# Patient Record
Sex: FEMALE | Race: WHITE | NOT HISPANIC OR LATINO | Employment: OTHER | URBAN - METROPOLITAN AREA
[De-identification: names, ages, dates, MRNs, and addresses within clinical notes are randomized per-mention and may not be internally consistent; named-entity substitution may affect disease eponyms.]

---

## 2017-05-19 ENCOUNTER — INFUSION - HEALTHEAST (OUTPATIENT)
Dept: INFUSION THERAPY | Facility: CLINIC | Age: 62
End: 2017-05-19

## 2017-05-19 ENCOUNTER — INFUSION - HEALTHEAST (OUTPATIENT)
Dept: INFUSION THERAPY | Facility: HOSPITAL | Age: 62
End: 2017-05-19

## 2017-05-19 DIAGNOSIS — Z20.9 EXPOSURE TO BAT WITHOUT KNOWN BITE: ICD-10-CM

## 2017-05-23 ENCOUNTER — INFUSION - HEALTHEAST (OUTPATIENT)
Dept: INFUSION THERAPY | Facility: CLINIC | Age: 62
End: 2017-05-23

## 2017-05-23 DIAGNOSIS — Z20.9 EXPOSURE TO BAT WITHOUT KNOWN BITE: ICD-10-CM

## 2017-05-30 ENCOUNTER — RECORDS - HEALTHEAST (OUTPATIENT)
Dept: ADMINISTRATIVE | Facility: OTHER | Age: 62
End: 2017-05-30

## 2017-05-30 ENCOUNTER — INFUSION - HEALTHEAST (OUTPATIENT)
Dept: INFUSION THERAPY | Facility: CLINIC | Age: 62
End: 2017-05-30

## 2017-05-30 DIAGNOSIS — Z20.9 EXPOSURE TO BAT WITHOUT KNOWN BITE: ICD-10-CM

## 2017-08-19 ENCOUNTER — OFFICE VISIT - HEALTHEAST (OUTPATIENT)
Dept: FAMILY MEDICINE | Facility: CLINIC | Age: 62
End: 2017-08-19

## 2017-08-19 DIAGNOSIS — M25.50 ARTHRALGIA: ICD-10-CM
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
## 2017-08-19 DIAGNOSIS — S80.861A: ICD-10-CM

## 2021-05-31 VITALS — WEIGHT: 141.4 LBS | BODY MASS INDEX: 21.5 KG/M2

## 2021-06-10 NOTE — PROGRESS NOTES
Pt working in woods, saw a bat fly at her, saw skin broken L AC; unsure if bat bit her or not. Rabies info sheet given by Carly BRANTLEY. Pt tolerated inj well.

## 2021-06-15 PROBLEM — Z20.9 EXPOSURE TO BAT WITHOUT KNOWN BITE: Status: ACTIVE | Noted: 2017-05-19

## 2021-06-25 NOTE — PROGRESS NOTES
Progress Notes by Micheal Morfin DO at 8/19/2017 11:00 AM     Author: Micheal Morfin DO Service: -- Author Type: Physician    Filed: 8/20/2017  1:28 PM Encounter Date: 8/19/2017 Status: Signed    : Micheal Morfin DO (Physician)       Chief Complaint   Patient presents with   ? Insect Bite     had tick bite end of May. Still having fatigue and muscle pain.        History of Present Illness: Nursing notes reviewed. Patient had a bite from a very small, dark tick in right calf area, with a 3-4 area of redness noted there lasting for weeks. This bite area has healed up. She had had persistent headache and joint aches since the tick bite. No fever. The joints hurt worse then the muscles. She prefers not to take an antibiotic treatment until seeing what the lab results show.    Review of systems: See history of present illness, otherwise negative.     No current outpatient prescriptions on file.     No current facility-administered medications for this visit.        No past medical history on file.   No past surgical history on file.   Social History     Social History   ? Marital status: Single     Spouse name: N/A   ? Number of children: N/A   ? Years of education: N/A     Social History Main Topics   ? Smoking status: Former Smoker   ? Smokeless tobacco: None   ? Alcohol use No   ? Drug use: None   ? Sexual activity: Not Asked     Other Topics Concern   ? None     Social History Narrative       History   Smoking Status   ? Former Smoker   Smokeless Tobacco   ? Not on file      Exam:   Blood pressure 126/72, pulse 68, temperature 97.5  F (36.4  C), temperature source Oral, weight 141 lb 6.4 oz (64.1 kg), SpO2 98 %.    EXAM:   General: Vital signs reviewed. Patient is in no acute appearing distress with a pleasant affect. Breathing is non labored appearing. Patient is alert and oriented x 3.   ENT: Tympanic membranes are clear and without injection bilaterally, nasal turbinates show no injection or rhinorrhea,  "no pharyngeal injection or exudate.  Eyes: normal white sclera  Neck: supple with no adenopathy.  Heart: Normal rate and rhythm without murmur  Lungs: Clear to auscultation with good air flow bilaterally.  Skin: warm and dry. No areas of rash or other skin discoloration. No abnormality noted in the reported area of tick bite in right lower leg. No small or large joint edema, discoloration, or increased temperature.  Patient preferred to only have the tests done for assessment for specific tick related illnesses.    Assessment/Plan   1. Tick bite of right lower leg  Rickettsia rickettsii Antibody    Lyme Antibody Cascade    Ehrlichia/Anaplasma, Molecular Detection, PCR, Whole Blood   2. Arthralgia  Rickettsia rickettsii Antibody    Lyme Antibody Cascade    Ehrlichia/Anaplasma, Molecular Detection, PCR, Whole Blood       Patient Instructions     Also see info below. We will notify you of the results of studies not known at time of exam, and treat appropriately.    Tick Bites  Ticks are insects that feed on the blood of animals and humans. They may gorge themselves for days before you find and remove them. The bites themselves aren't cause for concern. But ticks can carry and transmit illnesses such as Lyme disease and [place] spotted fever. Both diseases begin with a rash and symptoms similar to the flu. In advanced stages, these diseases can be quite serious.     A \"bull's eye\" rash is a common symptom of Lyme disease.   When to go to the emergency department (ED)  Not all ticks carry disease. And a tick must remain attached for at least 24 hours to infect you. If you find a tick, don't panic. Try to carefully remove it with tweezers. Grasp the insect near its head and pull without twisting. If you can't easily dislodge the tick or if you leave the head in your skin, get medical care right away.  What to expect in the ED    The tick or any remnants will be removed and the bite cleaned.    To prevent disease, " you may be given antibiotics. Both Lyme disease and [place] spotted fever respond quickly to these medications.    You may be asked to see your health care provider for a blood test to check for Lyme disease.  Follow-up care  If you remove a tick yourself, watch for signs of a tick-borne illness. Symptoms may appear within a few days or weeks after a bite. Call your health care provider if you notice any of the following:    Rash (This may spread outward in a ring from a hard white lump. Or, it may move up your arms and legs to your chest.)    Chills and fever    Body aches and joint pain    Severe headache  Date Last Reviewed: 11/30/2014 2000-2016 [employer]. [address] [place] [address], [place] [id number]. All rights reserved. This information is not intended as a substitute for professional medical care. Always follow your healthcare professional's instructions.        Understanding Ehrlichiosis  When a tick bites you, it can cause an infection. Some types of ticks can pass on the bacteria that cause ehrlichiosis. This infection can develop into a serious illness. If you get a tick bite and then develop symptoms, talk with your healthcare provider right away.  How to say it:  [id number]   What causes ehrlichiosis?  The bacteria that cause ehrlichiosis are passed to people through tick bites. Ticks are most active between April and September. The risk of getting bitten and infected is higher during these months. Ehrlichiosis may also be passed on through blood transfusions or by direct contact with an infected deer.  What are the symptoms of ehrlichiosis?  Symptoms usually show up 1 to 2 weeks after you are bitten by a tick. Symptoms may include:    Fever or chills    Headache    Muscle or joint pain    Tiredness or feeling unwell    Swollen lymph nodes    Rash (more common in children)    Red eyes    Nausea, vomiting, or diarrhea    Confusion    Cough  How is ehrlichiosis  treated?  Treatment focuses on killing the bacteria that cause ehrlichiosis. This is done by taking antibiotics. Other medicines can help relieve pain.  How can I prevent ehrlichiosis?  Avoiding tick bites is the best way to prevent ehrlichiosis. Here are some ways to avoid getting tick bites:    Put insect repellent containing DEET on exposed skin when you are outside. Use DEET very cautiously on young children.    Treat clothing and hiking or camping gear with products that have permethrin.    Avoid walking through brush and grass.    Look for ticks on yourself when you have been outdoors. Check all parts of your body. Remove any ticks you find right away.    If youe any pets, check them for ticks after they have been outdoors  What are the possible complications of ehrlichiosis?  Children and people with a weak immune system are more likely than healthy adults to have complications. Complications of ehrlichiosis can include:    Difficulty breathing    Bleeding problems    Liver or kidney failure    Inflammation or infection of the brain or its covering    Death  When should I call my healthcare provider?  Call your healthcare provider right away if you have any of these:    Fever of 100.4 F (38 C) or higher, or as directed    Pain that gets worse    Symptoms that dont get better with treatment, or symptoms that get worse    New symptoms   Date Last Reviewed: 3/30/2016    4120-3368 [employer] [address] [place] [address], [place] [address]. All rights reserved. This information is not intended as a substitute for professional medical care. Always follow your healthcare professional's instructions.        Preventing Lyme Disease  Ticks are small spider-like arachnids that feed on the blood of animals and humans. They can carry the bacteria that cause Lyme disease. The bacteria can pass to a person from a tick bite. (It is not passed from person to person.) Lyme disease can lead to serious health problems  if it is not treated with antibiotics. The best way to prevent Lyme disease is to avoid being bitten by a tick.     The tick that carries Lyme disease is very small--about the size of a poppy seed.   Preventing tick bites  The disease is carried by the blacklegged tick, also called a deer tick. Young ticks called nymphs are the most likely to carry the bacteria. They are very small--about the size of a poppy seed. They can be found on deer, rodents, and birds. Often the animal brushes the tick onto leaves or other plants as it runs through the woods and then the tick lives in bushes, grasses, and dead leaves. The most active time of year for infected ticks varies by region of the country. In the [place] and [place], they are more active from April to September. In the [place], they may be more active from December to February. But you can still be bitten at other times of the year. Below are tips for protecting yourself from tick bites.  Protect your body    Wear clothes that protect you. Clothing can help protect you from tick bites. Wear long pants and long sleeves in outdoor areas where ticks may live. Tuck your shirt into your pants. Tuck pant legs into your socks. And wear light colors so you can more easily see ticks on your clothes.    Use insect repellent. Spray insect repellent containing at least 20 percent DEET on your exposed skin. You can also use it on clothing, shoes, and camping gear. Avoid getting DEET on childrens hands, mouth, or eyes. You can use products with permethrin on clothing, shoes, and camping gear. But do not spray permethrin on skin. It will cause a rash. Follow the directions on the package of the spray you use. For more information on bug sprays, visit the National Pesticide Information Center at http://npic.orst.edu.    Avoid tick-infested areas. Avoid brushing against grasses, bushes, and other plants. Avoid walking through dead leaves and other ground vegetation. Do not sit on fallen  logs. And avoid areas with large numbers of deer and rodents.    Check yourself for ticks. After being outdoors, check your clothes and skin for ticks. Keep in mind that the ticks are about the size of a poppy seed. Use both a hand-held and a full-length mirror to view all of your skin. Pay special attention to areas with hair. Make sure to thoroughly check these areas:    Scalp    Behind the ears    Armpits    Belly button    Waist    Groin    Backs of the knees    Use the clothes dryer. Putting clothing or bedding into a clothes dryer for 1 hour at high heat has been shown to kill ticks.  Control ticks around your home    Create tick-free safety zones. Ticks that transmit Lyme disease live in ground vegetation. Ticks crawl onto people from shrubs and grasses in and around wooded areas. Cut bushes and other plants away from the deck or patio and any child play areas. Keep all grasses cut short. Remove dead leaves and other dead vegetation. Do not put childrens play equipment near wooded areas. Put wood chips or gravel on the ground between lawns and wooded areas.    Use pesticides. You can apply them yourself or hire a pest control expert. States have different regulations about pesticides. Ask your local health department for information.    Keep deer away. Deer often carry the ticks that can infect you with Lyme disease. Do not attempt to pet or feed deer. Ask your local garden center about deer-resistant plants. Ask your local health department about deer control in your area.    Prevent ticks on pets. Pets can bring ticks into your home. Use tick control medicine as advised by your . Check your pet for ticks after it comes indoors. Pay special attention to the ears.    Ask about local tick-control methods. Some states have tick-control programs. Local health departments may be using methods that can help you control ticks at home.  If you have a tick  If you find a tick on your skin, do not panic.  Most ticks don't carry Lyme disease. And the tick must be attached for 36 to 48 hours before it might infect you. If you find a tick on you, heres what to do:    If the tick is not yet attached to your skin, remove the tick with tweezers or a tissue. Flush it down the toilet. If you see a tick on your clothes, use a piece of tape to lift it off. Do not touch it with your bare hands.    If the tick is attached to your skin:    Carefully remove the tick with tweezers. If you dont have tweezers, use your fingers protected by a paper towel or a thin cloth. Grasp the tick as close to your skin as possible. Pull slowly and gently to remove the tick. The tick may not let go right away. Do not pull harder. Be patient and keep trying gently. Do not twist the head or body as you pull. Do not crush or squeeze the body. This can release the bacteria into your body. Never use a hot match, petroleum jelly, or other products to remove a tick. (If you cant remove the tick or if part of the tick remains in the skin, call your healthcare provider right away.)    Wash your skin with soap and water after you remove the tick. This will help ensure you remove any bacteria.    If you can, save the tick in a tightly sealed glass or plastic container. Take it to your healthcare provider. He or she may be able to have someone identify if it is the type of tick that transmits Lyme.    Call your healthcare provider and describe the bite and the tick. You may be asked to come in for an exam. You may be tested for Lyme. You may also be prescribed antibiotics to help prevent infection.    Over the next month, watch for the symptoms below, especially a rash at the site of the bite.  Symptoms of Lyme disease  Call your healthcare provider if you develop any symptoms of Lyme disease, even if you dont remember being bitten. These include:    A round, red rash (called a bulls-eye rash)    Fever and chills    Tiredness or body aches    Headache or a  stiff neck    Joint pain and swelling (arthritis), especially in large joints such as the knees   To learn more    Centers for Disease Control and Prevention: www.cdc.gov/lyme    American Lyme Disease Foundation: www.aldf.com  Date Last Reviewed: 11/1/2016 2000-2016 [employer]. [address] [place] [address]. All rights reserved. This information is not intended as a substitute for professional medical care. Always follow your healthcare professional's instructions.        Understanding [place] Spotted Fever  Bl[place] spotted fever is a bacterial infection that can be spread through tick bites. Its a serious illness, but it can be cured with medicine if caught early. Despite its name, [place] spotted fever can affect people anywhere in the [place] It can also affect people in other parts of [place].  What causes [place] spotted fever?  Bl[place]et spotted fever is caused by bacteria called Rickettsia rickettsii. These bacteria can be carried by certain kinds of ticks. People can get [place] spotted fever after being bitten by an infected tick.  What are the symptoms of [place] spotted fever?  Symptoms often appear within 2 weeks of infection. They may include:    Fever    Non-itchy rash    Headache or confusion    Muscle and joint pain    Nausea with or without vomiting    Abdominal pain and appetite loss (more often seen in children)  Rash is a very common symptom of [place] spotted fever, but some people who are infected may not have at a rash at all. If the rash does appear, this is often within a few days. The rash appears as small, flat red spots on the ankles and wrists. Later it may spread to the torso, and to the palms of the hands and soles of the feet. With time, the rash may get worse, and appear as red or purple blotches.  How is [place] spotted fever treated?  [place] spotted fever is treated with  antibiotics. These medicines can kill the bacteria and clear the infection. Be sure to take any antibiotics youre prescribed exactly as directed. If your symptoms are severe, you may need to be treated in the hospital.  What are the possible complications of [place] spotted fever?  If not treated early enough, [place] spotted fever can damage the blood vessels in the body. This can lead to serious problems, such as:    Bleeding and blood clots    Damage to internal organs, such as the brain, heart, lungs, and kidneys    Tissue death in fingers, toes, or limbs. This may require amputation to treat.    Death  How can I prevent [place] spotted fever?  To help reduce the risk for [place] spotted fever, follow these tips:    Wear long-sleeved shirts and long pants when outdoors. This is especially important during April through September, when ticks are most active.    Use insect repellant on your clothing or skin. This can help prevent tick bites.    Check your skin for ticks, especially after hiking through grassy or wooded areas. If a tick is found, remove it completely, including the head. Disinfect the skin afterward, wash your hands, and contact your healthcare provider.    If you have a pet that is allowed to go outdoors, be sure to check it regularly for ticks. Remove any that are found.  When should I call my healthcare provider?  [place] spotted fever should always be treated by a healthcare provider.  Call your healthcare provider right away if you have any of these symptoms after a tick bite:    Fever    Non-itchy rash    Headache or confusion    Muscle and joint pain    Nausea with or without vomiting    Abdominal pain and appetite loss (more often seen in children)   Date Last Reviewed: 3/28/2016    0311-4214 [employer]. [address] [place] [address], [place] [address]. All rights reserved. This information is not intended as a substitute for professional medical  care. Always follow your healthcare professional's instructions.           Micheal Morfin, DO

## 2022-06-02 ENCOUNTER — HOSPITAL ENCOUNTER (EMERGENCY)
Facility: CLINIC | Age: 67
Discharge: HOME | End: 2022-06-02
Attending: EMERGENCY MEDICINE | Admitting: EMERGENCY MEDICINE
Payer: COMMERCIAL

## 2022-06-02 ENCOUNTER — APPOINTMENT (OUTPATIENT)
Dept: CT IMAGING | Facility: CLINIC | Age: 67
End: 2022-06-02
Attending: EMERGENCY MEDICINE
Payer: COMMERCIAL

## 2022-06-02 VITALS
OXYGEN SATURATION: 96 % | TEMPERATURE: 98.9 F | SYSTOLIC BLOOD PRESSURE: 160 MMHG | DIASTOLIC BLOOD PRESSURE: 70 MMHG | RESPIRATION RATE: 18 BRPM | HEART RATE: 80 BPM | HEIGHT: 68 IN | BODY MASS INDEX: 25.76 KG/M2 | WEIGHT: 170 LBS
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
## 2022-06-02 DIAGNOSIS — S09.93XA FACIAL INJURY, INITIAL ENCOUNTER: ICD-10-CM

## 2022-06-02 PROCEDURE — 70450 CT HEAD/BRAIN W/O DYE: CPT

## 2022-06-02 PROCEDURE — 99284 EMERGENCY DEPT VISIT MOD MDM: CPT | Mod: 25

## 2022-06-02 ASSESSMENT — ENCOUNTER SYMPTOMS
VOMITING: 0
FEVER: 0
ABDOMINAL PAIN: 0
DYSURIA: 0
WEAKNESS: 0
SHORTNESS OF BREATH: 0
COUGH: 0
FATIGUE: 0
CHILLS: 0
NECK PAIN: 1
DIZZINESS: 1
HEADACHES: 0

## 2022-06-02 NOTE — ED PROVIDER NOTES
EMERGENCY DEPARTMENT ENCOUNTER      NAME: Yesica Hartman  AGE: 67 year old female  YOB: 1955  MRN: [id number]  EVALUATION DATE & TIME: No admission date for patient encounter.    PCP: No primary care provider on file.    ED PROVIDER: Anita Kaye DO      Chief Complaint   Patient presents with     Fall     Facial Injury         FINAL IMPRESSION:  1. Fall, initial encounter    2. Facial injury, initial encounter          ED COURSE & MEDICAL DECISION MAKING:    Pertinent Labs & Imaging studies reviewed. (See chart for details)    10:12 AM I met the patient and performed my initial interview and exam.  12:35 PM We discussed the plan for discharge and the patient is agreeable. Reviewed supportive cares, symptomatic treatment, outpatient follow up, and reasons to return to the Emergency Department. Patient to be discharged by ED RN.     67 year old female presents to the Emergency Department for evaluation of facial and head injury after fall.  She reports she was in a park cleaning up when she tripped over a tree limb and fell face first.  No loss of consciousness.  She noted pain to her nasal bridge and bleeding from her nose.  She is concerned about a head injury.  She does have some dizziness after the fall.  No dizziness before the fall.  No midline cervical tenderness to palpation or full range of motion.  She does have some lateral neck/trapezius tenderness that she reports was there this morning that she attributed to sleeping wrong.  She has no neurologic deficits.  She has some tenderness to her nasal bridge with ecchymosis but no mid facial pain.  Normal extraocular movements.  We discussed imaging of her head.  Offered CT imaging of her facial bones, however even if patient has a nasal bone fracture my suspicion for a more significant fracture is low and nasal bone fracture would not change her management.  She deferred CT imaging of her face.  She declined any pain medications.  She was  ambulatory with a normal gait.  She has no other report or signs of trauma on exam.  The bleeding to her nose is stopped.  CT of the head unremarkable.  Did discuss results with patient.  Discussed symptomatic care including ice, Tylenol, Motrin.  Encourage return if any acute worsening of symptoms, confusion or any other concerns.    At the conclusion of the encounter I discussed the results of all of the tests and the disposition. The questions were answered. The patient or family acknowledged understanding and was agreeable with the care plan.       MEDICATIONS GIVEN IN THE EMERGENCY:  Medications - No data to display    NEW PRESCRIPTIONS STARTED AT TODAY'S ER VISIT  New Prescriptions    No medications on file          =================================================================    HPI    Patient information was obtained from: Patient    Use of : N/A         Yesica Hartman is a 67 year old female with a noncontributory past medical history who presents to this ED via private auto for evaluation of fall.    At ~8:30AM, Patient was volunteering at a park cleaning up trees when she tripped over a branch and landed on her face. She did not lose consciousness. She obtained an abrasion to the bridge of her nose and reports it immediately began bleeding heavily. After applying pressure for a while her symptoms of bleeding were resolved. She now has bruising to the bridge of her nose with associated left sided nose pain. Patient was concerned about internal bleeding, prompting her ED visit. Currently, she endorses in dizziness as well as mild right sided neck pain. She believes her neck pain was present prior to the fall, but nevertheless reports it is provoked with rotation of the head.     Patient is not on any blood thinners. She is vaccinated x2. No other complaints at this time.     REVIEW OF SYSTEMS   Review of Systems   Constitutional: Negative for chills, fatigue and fever.   Respiratory: Negative for  "cough and shortness of breath.    Cardiovascular: Negative for chest pain.   Gastrointestinal: Negative for abdominal pain and vomiting.   Genitourinary: Negative for dysuria.   Musculoskeletal: Positive for neck pain (Right sided).        Positive for nose pain   Skin: Negative.         Positive for bruising to bridge of nose   Neurological: Positive for dizziness. Negative for syncope, weakness and headaches.        Negative for LOC   All other systems reviewed and are negative.     PAST MEDICAL HISTORY:  No past medical history on file.    PAST SURGICAL HISTORY:  Past Surgical History:   Procedure Laterality Date     BREAST SURGERY       COLONOSCOPY  10/5/2012    Procedure: COLONOSCOPY;  Colonoscopy, screening;  Surgeon: Micheal Hamm MD;  Location: [facility] OR           CURRENT MEDICATIONS:    ASPIRIN PO         ALLERGIES:  No Known Allergies    FAMILY HISTORY:  No family history on file.    SOCIAL HISTORY:   Social History     Socioeconomic History     Marital status: Single   Tobacco Use     Smoking status: Former Smoker   Substance and Sexual Activity     Alcohol use: No     Drug use: No       PHYSICAL EXAM    VITAL SIGNS: BP (!) 168/72   Pulse 73   Temp 98.9  F (37.2  C) (Oral)   Resp 20   Ht 1.727 m (5' 8\")   Wt 77.1 kg (170 lb)   SpO2 99%   BMI 25.85 kg/m     Constitutional:  Well developed, Well nourished,  HENT:  Normocephalic, Atraumatic, Bilateral external ears normal, No Hemotympanum, Oropharynx moist, No oral exudates, Nose normal. No facial trauma  Neck:  Normal range of motion, No c-spine tenderness, Supple, No stridor.   Eyes:  PERRL, EOMI, Conjunctiva normal, No discharge, Vision normal  Respiratory:  Equal breath sounds bilaterally, No respiratory distress, No wheezing, No chest tenderness or deformity.   Cardiovascular:  Normal heart rate, Normal rhythm, No murmurs, No rubs, No gallops.   GI:  Soft, No tenderness, No gaurding, No CVA tenderness, no echymosis.   Musculoskeletal:  " Neurovascularly intact distally, No edema, No tenderness, No cyanosis, No clubbing. Good range of motion in all major joints. No tenderness to palpation or major deformities noted.   Back:  No t-spine or l-spine tenderness, no step offs.   Integument:  Warm, Dry, No rash. Ecchymosis of nasal bridge with dried blood in the right nares.   Neurologic:  Alert & oriented x 3, Normal motor function, Normal sensory function, No focal deficits noted.   Psychiatric:  Affect normal, Judgment normal, Mood normal.     LAB:  All pertinent labs reviewed and interpreted.  Labs Ordered and Resulted from Time of ED Arrival to Time of ED Departure - No data to display    RADIOLOGY:  Reviewed all pertinent imaging. Please see official radiology report.  Head CT w/o contrast   Final Result   IMPRESSION:   1.  No acute intracranial abnormality.            I, Consuelo Lovell, am serving as a scribe to document services personally performed by Dr. Anita Kaye based on my observation and the provider's statements to me. IAnita, DO attest that Consuelo Lovell is acting in a scribe capacity, has observed my performance of the services and has documented them in accordance with my direction.    Anita Kaye DO  Emergency Medicine  [facility] EMERGENCY ROOM  [address] [facility] [address] [phone]  Dept: [phone]     Anita Kaye DO  06/02/22 4663

## 2022-06-02 NOTE — ED TRIAGE NOTES
"Was in a park cleaning up tree debris when tripped over a limb and fell face first. Complaining of mild facial pain primarily to bridge of nose. Swelling and discoloration noted. Denies LOC, blood thinners or any other injury.  Concerned that she might have a head bleed since \"that is what that   of\"       Triage Assessment     Row Name [id number]       Triage Assessment (Adult)    Airway WDL WDL       Respiratory WDL    Respiratory WDL WDL       Skin Circulation/Temperature WDL    Skin Circulation/Temperature WDL WDL       Cardiac WDL    Cardiac WDL WDL       Peripheral/Neurovascular WDL    Peripheral Neurovascular WDL WDL       Cognitive/Neuro/Behavioral WDL    Cognitive/Neuro/Behavioral WDL WDL              "

## 2022-06-02 NOTE — DISCHARGE INSTRUCTIONS
Scan of your brain does not show any bleeding or fractures  They did not comment on an obvious nasal bone fracture  Continue ice to your nose and face help with any swelling and bruising  If you get a recurrent nosebleed, recommend holding pressure for 15 to 20 minutes, may also use Afrin in your nose to help stop bleeding  Return if any acute worsening symptoms, confusion, intractable pain or any other concerns.

## 2022-11-17 ENCOUNTER — APPOINTMENT (OUTPATIENT)
Dept: CT IMAGING | Facility: CLINIC | Age: 67
End: 2022-11-17
Attending: EMERGENCY MEDICINE
Payer: COMMERCIAL

## 2022-11-17 ENCOUNTER — HOSPITAL ENCOUNTER (EMERGENCY)
Facility: CLINIC | Age: 67
Discharge: HOME | End: 2022-11-17
Attending: EMERGENCY MEDICINE | Admitting: EMERGENCY MEDICINE
Payer: COMMERCIAL

## 2022-11-17 ENCOUNTER — APPOINTMENT (OUTPATIENT)
Dept: ULTRASOUND IMAGING | Facility: CLINIC | Age: 67
End: 2022-11-17
Attending: EMERGENCY MEDICINE
Payer: COMMERCIAL

## 2022-11-17 VITALS
HEART RATE: 78 BPM | WEIGHT: 170 LBS | RESPIRATION RATE: 16 BRPM | OXYGEN SATURATION: 97 % | HEIGHT: 68 IN | DIASTOLIC BLOOD PRESSURE: 74 MMHG | BODY MASS INDEX: 25.76 KG/M2 | SYSTOLIC BLOOD PRESSURE: 169 MMHG

## 2022-11-17 DIAGNOSIS — R10.9 RIGHT FLANK PAIN: ICD-10-CM

## 2022-11-17 LAB
ALBUMIN SERPL-MCNC: 4.4 G/DL (ref 3.5–5)
ALBUMIN UR-MCNC: NEGATIVE MG/DL
ALP SERPL-CCNC: 75 U/L (ref 45–120)
ALT SERPL W P-5'-P-CCNC: 16 U/L (ref 0–45)
ANION GAP SERPL CALCULATED.3IONS-SCNC: 6 MMOL/L (ref 5–18)
APPEARANCE UR: CLEAR
AST SERPL W P-5'-P-CCNC: 21 U/L (ref 0–40)
BILIRUB SERPL-MCNC: 0.6 MG/DL (ref 0–1)
BILIRUB UR QL STRIP: NEGATIVE
BUN SERPL-MCNC: 9 MG/DL (ref 8–22)
CALCIUM SERPL-MCNC: 9.6 MG/DL (ref 8.5–10.5)
CHLORIDE BLD-SCNC: 100 MMOL/L (ref 98–107)
CO2 SERPL-SCNC: 31 MMOL/L (ref 22–31)
COLOR UR AUTO: COLORLESS
CREAT SERPL-MCNC: 0.78 MG/DL (ref 0.6–1.1)
ERYTHROCYTE [DISTWIDTH] IN BLOOD BY AUTOMATED COUNT: 13.9 % (ref 10–15)
GFR SERPL CREATININE-BSD FRML MDRD: 82 ML/MIN/1.73M2
GLUCOSE BLD-MCNC: 94 MG/DL (ref 70–125)
GLUCOSE UR STRIP-MCNC: NEGATIVE MG/DL
HCT VFR BLD AUTO: 42.2 % (ref 35–47)
HGB BLD-MCNC: 13.5 G/DL (ref 11.7–15.7)
HGB UR QL STRIP: NEGATIVE
KETONES UR STRIP-MCNC: ABNORMAL MG/DL
LEUKOCYTE ESTERASE UR QL STRIP: NEGATIVE
LIPASE SERPL-CCNC: 40 U/L (ref 0–52)
MCH RBC QN AUTO: 28.2 PG (ref 26.5–33)
MCHC RBC AUTO-ENTMCNC: 32 G/DL (ref 31.5–36.5)
MCV RBC AUTO: 88 FL (ref 78–100)
NITRATE UR QL: NEGATIVE
PH UR STRIP: 7.5 [PH] (ref 5–7)
PLATELET # BLD AUTO: 249 10E3/UL (ref 150–450)
POTASSIUM BLD-SCNC: 3.8 MMOL/L (ref 3.5–5)
PROT SERPL-MCNC: 8 G/DL (ref 6–8)
RBC # BLD AUTO: 4.79 10E6/UL (ref 3.8–5.2)
RBC URINE: <1 /HPF
SODIUM SERPL-SCNC: 137 MMOL/L (ref 136–145)
SP GR UR STRIP: 1.02 (ref 1–1.03)
UROBILINOGEN UR STRIP-MCNC: <2 MG/DL
WBC # BLD AUTO: 4.3 10E3/UL (ref 4–11)
WBC URINE: <1 /HPF

## 2022-11-17 PROCEDURE — 99285 EMERGENCY DEPT VISIT HI MDM: CPT | Mod: 25

## 2022-11-17 PROCEDURE — 85027 COMPLETE CBC AUTOMATED: CPT | Performed by: EMERGENCY MEDICINE

## 2022-11-17 PROCEDURE — 83690 ASSAY OF LIPASE: CPT | Performed by: EMERGENCY MEDICINE

## 2022-11-17 PROCEDURE — 76705 ECHO EXAM OF ABDOMEN: CPT

## 2022-11-17 PROCEDURE — 80053 COMPREHEN METABOLIC PANEL: CPT | Performed by: EMERGENCY MEDICINE

## 2022-11-17 PROCEDURE — 250N000011 HC RX IP 250 OP 636: Performed by: EMERGENCY MEDICINE

## 2022-11-17 PROCEDURE — 74177 CT ABD & PELVIS W/CONTRAST: CPT

## 2022-11-17 PROCEDURE — 81001 URINALYSIS AUTO W/SCOPE: CPT | Performed by: EMERGENCY MEDICINE

## 2022-11-17 PROCEDURE — 36415 COLL VENOUS BLD VENIPUNCTURE: CPT | Performed by: EMERGENCY MEDICINE

## 2022-11-17 RX ORDER — IOPAMIDOL 755 MG/ML
90 INJECTION, SOLUTION INTRAVASCULAR ONCE
Status: COMPLETED | OUTPATIENT
Start: 2022-11-17 | End: 2022-11-17

## 2022-11-17 RX ADMIN — IOPAMIDOL 90 ML: 755 INJECTION, SOLUTION INTRAVENOUS at 13:31

## 2022-11-17 ASSESSMENT — ENCOUNTER SYMPTOMS
BACK PAIN: 0
ABDOMINAL PAIN: 1
COUGH: 0
SHORTNESS OF BREATH: 0
TROUBLE SWALLOWING: 0
FLANK PAIN: 1
CONFUSION: 0
CHEST TIGHTNESS: 0
FEVER: 0
DIZZINESS: 0

## 2022-11-17 ASSESSMENT — ACTIVITIES OF DAILY LIVING (ADL): ADLS_ACUITY_SCORE: 35

## 2022-11-17 NOTE — DISCHARGE INSTRUCTIONS
Ultrasound and CT imaging and lab test did not show an explanation for your symptoms.  Recommend follow-up with your primary care doctor for further testing if her symptoms persist.  Return to  the ER for worsening symptom such as fever or worsening pain.

## 2022-11-17 NOTE — ED TRIAGE NOTES
Pt reports worsening R flank and RUQ abdominal pain with associated nausea for the past week.      Triage Assessment     Row Name 11/17/22 1150       Triage Assessment (Adult)    Airway WDL WDL       Respiratory WDL    Respiratory WDL WDL       Skin Circulation/Temperature WDL    Skin Circulation/Temperature WDL WDL       Cardiac WDL    Cardiac WDL WDL       Peripheral/Neurovascular WDL    Peripheral Neurovascular WDL WDL       Cognitive/Neuro/Behavioral WDL    Cognitive/Neuro/Behavioral WDL WDL

## 2022-11-17 NOTE — ED PROVIDER NOTES
EMERGENCY DEPARTMENT ENCOUNTER      NAME: Yesica Hartman  AGE: 67 year old female  YOB: 1955  MRN: [id number]  EVALUATION DATE & TIME: 11/17/2022 11:42 AM    PCP: System, Provider Not In    ED PROVIDER: King Pop MD        Chief Complaint   Patient presents with     Abdominal Pain         FINAL IMPRESSION:  1. Right flank pain          ED COURSE & MEDICAL DECISION MAKING:    Pertinent Labs & Imaging studies reviewed. (See chart for details)  67 year old female presents to the Emergency Department for evaluation of intermittent right-sided flank pain    Pain goes from inferior scapula and wraps around to her right lower abdomen.  Some mild tenderness to right abdomen.  No pleuritic chest pain.  No shortness of breath.  No fever.  No cough.    Differential includes biliary colic, PE, ACS, AAA, renal colic, pyelonephritis, gas pain secondary to constipation, colitis, shingles, rib pain, pleurisy, referred pain from back    Based on history suspect biliary colic    UA, labs, ultrasound ordered of right upper quadrant    Ultrasound negative    Pneumonia unlikely.  Plan for CT abdomen to evaluate for AAA, renal colic, obstruction, appendicitis    CT imaging does not show for patient's symptoms    UA ordered which does not show evidence of UTI    Unclear etiology of patient's symptoms.  Based on history and examination pulmonary emboli, ACS, or dissection unlikely    No rash to suggest shingles    Well-appearing.  Pain well controlled.  Plan for discharge home    Plan for discharge home and follow-up with primary care doctor.  Return to ER for worsening symptoms    ED Course as of 11/17/22 1430   Thu Nov 17, 2022   1428 Primary care referral created         Medical Decision Making    Supplemental history from: N/A    External Record(s) Reviewed: N/A    Differential Diagnosis: See MDM charting for differential considered.     I performed an independent interpretation of the: N/A    Discussed with radiology  "regarding test interpretation: N/A    Discussion of management with another provider: See chart documentation, if applicable    The following testing was considered but ultimately not selected: X-rays: No focal weakness or crackles no cough therefore chest x-ray not performed    I considered prescription management with: N/A    The patient's care impacted: None    Consideration of Admission/Observation: N/A - Patient discharged without consideration for admission    Care significantly affected by Social Determinants of Health including: N/A      11:53 AM I met with the patient to gather history and to perform my initial exam. I discussed the plan for care while in the Emergency Department. PPE (gloves and N95 mask) was worn during patient encounters.   1:30 PM I reevaluated patient.  1:50 PM I updated patient.    At the conclusion of the encounter I discussed the results of all of the tests and the disposition. The questions were answered. The patient or family acknowledged understanding and was agreeable with the care plan.       MEDICATIONS GIVEN IN THE EMERGENCY:  Medications   iopamidol (ISOVUE-370) solution 90 mL (90 mLs Intravenous Given 11/17/22 1331)       NEW PRESCRIPTIONS STARTED AT TODAY'S ER VISIT  Discharge Medication List as of 11/17/2022  2:10 PM             =================================================================    HPI    Triage note  \"  Pt reports worsening R flank and RUQ abdominal pain with associated nausea for the past week.      Triage Assessment     Row Name 11/17/22 1150       Triage Assessment (Adult)    Airway WDL WDL       Respiratory WDL    Respiratory WDL WDL       Skin Circulation/Temperature WDL    Skin Circulation/Temperature WDL WDL       Cardiac WDL    Cardiac WDL WDL       Peripheral/Neurovascular WDL    Peripheral Neurovascular WDL WDL       Cognitive/Neuro/Behavioral WDL    Cognitive/Neuro/Behavioral WDL WDL              \"      Patient information was obtained from: " "Patient    Use of : N/A        Yesica Hartman is a 67 year old female with no pertinent history who presents to this ED via private vehicle for evaluation of abdominal pain. Patient states that she has had ~1 week of right-sided flank pain that is worse at night. Pain is currently 4/10 but can be 10/10 at night when it wraps around to her RUQ. Patient notes that she is stressed and has recently had a bad diet. Denies cholecystectomy. Endorses nausea and constipation. Denies urinary symptoms, fever, rash, chest pain, and cough. Her last bowel movement was ~1 day ago.  No pleuritic chest pain.  Does have urinary frequency but no dysuria        REVIEW OF SYSTEMS   Review of Systems   Constitutional: Negative for fever.   HENT: Negative for trouble swallowing.    Respiratory: Negative for cough, chest tightness and shortness of breath.    Cardiovascular: Negative for chest pain.   Gastrointestinal: Positive for abdominal pain (RUQ).   Genitourinary: Positive for flank pain (right).   Musculoskeletal: Negative for back pain.   Skin: Negative for rash.   Neurological: Negative for dizziness.   Psychiatric/Behavioral: Negative for confusion.       PAST MEDICAL HISTORY:  History reviewed. No pertinent past medical history.    PAST SURGICAL HISTORY:  Past Surgical History:   Procedure Laterality Date     BREAST SURGERY       COLONOSCOPY  10/5/2012    Procedure: COLONOSCOPY;  Colonoscopy, screening;  Surgeon: Micheal Hamm MD;  Location: [facility] OR           CURRENT MEDICATIONS:    ASPIRIN PO        ALLERGIES:  No Known Allergies    FAMILY HISTORY:  History reviewed. No pertinent family history.    SOCIAL HISTORY:   Social History     Socioeconomic History     Marital status: Single   Tobacco Use     Smoking status: Former   Substance and Sexual Activity     Alcohol use: No     Drug use: No       VITALS:  BP (!) 169/74   Pulse 78   Resp 16   Ht 1.727 m (5' 8\")   Wt 77.1 kg (170 lb)   SpO2 97%   BMI 25.85 kg/m  " "    PHYSICAL EXAM      Vitals: BP (!) 169/74   Pulse 78   Resp 16   Ht 1.727 m (5' 8\")   Wt 77.1 kg (170 lb)   SpO2 97%   BMI 25.85 kg/m    General: Appears in no acute distress, awake, alert, interactive.  Eyes: Conjunctivae non-injected. Sclera anicteric.  HENT: Atraumatic.  Neck: Supple.  Respiratory/Chest: Respiration unlabored. Lungs clear.  No wheezing or crackles  Heart: No murmurs  Abdomen: Non distended, mild RUQ tenderness, no guarding or rigidity.   Musculoskeletal: Normal extremities. No edema or erythema.  Skin: Normal color. No rash or diaphoresis.  Neurologic: Face symmetric, moves all extremities spontaneously. Speech clear.  Psychiatric: Oriented to person, place, and time. Affect appropriate.       LAB:  All pertinent labs reviewed and interpreted.  Results for orders placed or performed during the hospital encounter of 11/17/22   Abdomen US, limited (RUQ only)    Impression    IMPRESSION:  1.  Normal limited abdominal ultrasound.       CT Abdomen Pelvis w Contrast    Impression    IMPRESSION:     1.  Normal-sized kidneys. No urinary tract obstruction.  2.  No acute bowel inflammation or mechanical obstruction.   CBC (+ platelets, no diff)   Result Value Ref Range    WBC Count 4.3 4.0 - 11.0 10e3/uL    RBC Count 4.79 3.80 - 5.20 10e6/uL    Hemoglobin 13.5 11.7 - 15.7 g/dL    Hematocrit 42.2 35.0 - 47.0 %    MCV 88 78 - 100 fL    MCH 28.2 26.5 - 33.0 pg    MCHC 32.0 31.5 - 36.5 g/dL    RDW 13.9 10.0 - 15.0 %    Platelet Count 249 150 - 450 10e3/uL   Comprehensive metabolic panel   Result Value Ref Range    Sodium 137 136 - 145 mmol/L    Potassium 3.8 3.5 - 5.0 mmol/L    Chloride 100 98 - 107 mmol/L    Carbon Dioxide (CO2) 31 22 - 31 mmol/L    Anion Gap 6 5 - 18 mmol/L    Urea Nitrogen 9 8 - 22 mg/dL    Creatinine 0.78 0.60 - 1.10 mg/dL    Calcium 9.6 8.5 - 10.5 mg/dL    Glucose 94 70 - 125 mg/dL    Alkaline Phosphatase 75 45 - 120 U/L    AST 21 0 - 40 U/L    ALT 16 0 - 45 U/L    Protein Total " 8.0 6.0 - 8.0 g/dL    Albumin 4.4 3.5 - 5.0 g/dL    Bilirubin Total 0.6 0.0 - 1.0 mg/dL    GFR Estimate 82 >60 mL/min/1.73m2   Result Value Ref Range    Lipase 40 0 - 52 U/L   UA with Microscopic reflex to Culture    Specimen: Urine, Midstream   Result Value Ref Range    Color Urine Colorless Colorless, Straw, Light Yellow, Yellow    Appearance Urine Clear Clear    Glucose Urine Negative Negative mg/dL    Bilirubin Urine Negative Negative    Ketones Urine Trace (A) Negative mg/dL    Specific Gravity Urine 1.022 1.001 - 1.030    Blood Urine Negative Negative    pH Urine 7.5 (H) 5.0 - 7.0    Protein Albumin Urine Negative Negative mg/dL    Urobilinogen Urine <2.0 <2.0 mg/dL    Nitrite Urine Negative Negative    Leukocyte Esterase Urine Negative Negative    RBC Urine <1 <=2 /HPF    WBC Urine <1 <=5 /HPF       RADIOLOGY:  Reviewed all pertinent imaging. Please see official radiology report.  CT Abdomen Pelvis w Contrast   Final Result   IMPRESSION:       1.  Normal-sized kidneys. No urinary tract obstruction.   2.  No acute bowel inflammation or mechanical obstruction.      Abdomen US, limited (RUQ only)   Final Result   IMPRESSION:   1.  Normal limited abdominal ultrasound.                  I, Shyanne Joyce, am serving as a scribe to document services personally performed by Wes Pop MD based on my observation and the provider's statements to me. I, Dr. Wes Pop, attest that Shyanne Joyce is acting in a scribe capacity, has observed my performance of the services and has documented them in accordance with my direction.    Wes Pop MD  Emergency Medicine  [facility] EMERGENCY ROOM  [address] [facility] [address]  [phone]     Wes Pop MD  11/17/22 2170

## 2022-11-18 ENCOUNTER — TELEPHONE (OUTPATIENT)
Dept: FAMILY MEDICINE | Facility: CLINIC | Age: 67
End: 2022-11-18

## 2022-11-18 NOTE — TELEPHONE ENCOUNTER
Reason for Call:  Appointment Request    Patient requesting this type of appt:  Hospital/ED Follow-Up     Requested provider: any female    Reason patient unable to be scheduled: Not within requested timeframe    When does patient want to be seen/preferred time: 3-5 days    Comments: patient wondering if she could be worked into [employer] or [place] f2f    Okay to leave a detailed message?: [facility] Home number on file [phone] (home)    Call taken on 11/18/2022 at 7:22 AM by Sandy Delong

## 2022-11-18 NOTE — TELEPHONE ENCOUNTER
There are no open appts within 3-5 days at [facility] either.     Pt notified via voicemail that they can call [facility] back ([phone]) to find an ED Follow-up/same day slot appt with our one provider taking new patients (Holli Naqvi CNP) in 2-3 weeks, otherwise central scheduling ([phone]) to try and find a provider elsewhere within [facility].     Can also call our clinic for RN triage if they need it or go back to ER/urgent care if they have worsening symptoms in the meantime.    Otherwise, if they have a PCP somewhere else, they could try to schedule with them.

## 2024-04-24 ENCOUNTER — OFFICE VISIT (OUTPATIENT)
Dept: FAMILY MEDICINE | Facility: CLINIC | Age: 69
End: 2024-04-24
Payer: COMMERCIAL

## 2024-04-24 VITALS
SYSTOLIC BLOOD PRESSURE: 170 MMHG | RESPIRATION RATE: 14 BRPM | OXYGEN SATURATION: 97 % | DIASTOLIC BLOOD PRESSURE: 84 MMHG | HEART RATE: 65 BPM | TEMPERATURE: 98.2 F

## 2024-04-24 DIAGNOSIS — S61.213A LACERATION OF LEFT MIDDLE FINGER WITHOUT FOREIGN BODY WITHOUT DAMAGE TO NAIL, INITIAL ENCOUNTER: Primary | ICD-10-CM

## 2024-04-24 PROCEDURE — 12001 RPR S/N/AX/GEN/TRNK 2.5CM/<: CPT | Performed by: FAMILY MEDICINE

## 2024-04-24 NOTE — PROGRESS NOTES
Assessment/Plan:   1. Laceration of left middle finger without foreign body without damage to nail, initial encounter    Acute laceration flap of the left middle index finger near the nail. 0.8-1.0cm.   #3 simple interrupted sutures of 4-0 ethilon were placed to close the wound.     Keep dressing on for 24 hours unless it soaks through.   Keep wound dry for 24 hours then may wash and rinse gently with mild soap and warm water twice daily. Pat dry.   Then apply a topical vaseline or antibiotic ointment over the stitches and cover with a bandaid to prevent a scab from forming over the stitches.     Suture removal in 7-10 days. You may call to make a nurse appointment for suture removal to the number on this page - no charge for removal of sutures in the [facility] system.   If you go to other system there will be a clinic visit charge but that may be more convenient for you transportation wise. '    Watch for increased swelling, pain, redness, or pus and return or see someone sooner.     I discussed red flag symptoms, return precautions to clinic/ER and follow up care with patient/parent.  Expected clinical course, symptomatic cares advised. Questions answered. Patient/parent amenable with plan.      Subjective:     Yesica Hartman is a 69 year old female who presents for management of a laceration to her left middle finger.  This occurred just this morning while she was cutting things in the kitchen.  The bleeding was brisk initially she has applied pressure to the wound and that has slowed down.  Wound is near the nail but does not extend into or affect the nail.  Knife used with a clean kitchen knife, no contamination or foreign body to the wound.    Tdap 2017    No Known Allergies    Current Outpatient Medications   Medication Sig Dispense Refill    ASPIRIN PO Take  by mouth as needed.         No current facility-administered medications for this visit.     Patient Active Problem List   Diagnosis    Exposure to bat  without known bite       Objective:     BP (!) 170/84   Pulse 65   Temp 98.2  F (36.8  C) (Oral)   Resp 14   SpO2 97%     Physical    General Appearance: Alert, pleasant, no distress, afebrile vital signs stable  Head: Normocephalic, without obvious abnormality, atraumatic  Eyes: Conjunctivae are normal.   Lungs: respirations unlabored  Extremities: Normal motion at the DIP joint of her left fingers normal capillary refill and sensation of the fingertips.  Palpable wrist pulses and normal handgrip.  Skin: There is a V-shaped laceration measuring in total 0.8 cm distal left third finger, dorsal side adjacent to the base of the nail.  No apparent nail disturbance.  No foreign body, no arterial bleeding.  Psychiatric: Patient has a normal mood and affect.     Procedure: After discussing the options we elected to proceed with suture repair.  1% lidocaine was infiltrated for local anesthesia.  Approximately 2 mL.  Wound was then irrigated with normal saline, explored to depth and prepped.  No apparent foreign body, no arterial bleeding  3 simple interrupted sutures of 4-0 Ethilon were placed with sterile technique without difficulty.  There was excellent wound approximation and hemostasis.  The area was cleaned and a bulky dressing applied.    This note has been dictated in part using voice recognition software.  Any grammatical or context distortions are unintentional and inherent to the software.  Please feel free to contact me directly for clarification if needed.

## 2024-04-24 NOTE — PATIENT INSTRUCTIONS
Keep dressing on for 24 hours unless it soaks through.   Keep wound dry for 24 hours then may wash and rinse gently with mild soap and warm water twice daily. Pat dry.   Then apply a topical vaseline or antibiotic ointment over the stitches and cover with a bandaid to prevent a scab from forming over the stitches.     #3 simple interrupted sutures of 4-0 ethilon were placed to close the wound.     Suture removal in 7-10 days. You may call to make a nurse appointment for suture removal to the number on this page - no charge for removal of sutures in the [facility] system.   If you go to other system there will be a clinic visit charge but that may be more convenient for you transportation wise. '    Watch for increased swelling, pain, redness, or pus and return or see someone sooner.

## 2024-05-02 ENCOUNTER — ALLIED HEALTH/NURSE VISIT (OUTPATIENT)
Dept: FAMILY MEDICINE | Facility: CLINIC | Age: 69
End: 2024-05-02
Payer: COMMERCIAL

## 2024-05-02 DIAGNOSIS — S61.213A LACERATION OF LEFT MIDDLE FINGER WITHOUT FOREIGN BODY WITHOUT DAMAGE TO NAIL, INITIAL ENCOUNTER: Primary | ICD-10-CM

## 2024-05-02 PROCEDURE — 99207 PR NO CHARGE NURSE ONLY: CPT

## 2024-05-02 NOTE — PROGRESS NOTES
Yesica Hartman presents to the clinic today for removal of sutures.  The patient has had the sutures in place for 8 days.  There has been no history of infection or drainage.  3 sutures are seen located on the tip of left middle finger.  The wound is healing well with no signs of infection.  Tetanus status is up to date.   All sutures were easily removed today.  Routine wound care discussed.  The patient will follow up as needed.     Sj London RN     [facility]